# Patient Record
Sex: MALE | Race: WHITE | NOT HISPANIC OR LATINO | URBAN - METROPOLITAN AREA
[De-identification: names, ages, dates, MRNs, and addresses within clinical notes are randomized per-mention and may not be internally consistent; named-entity substitution may affect disease eponyms.]

---

## 2021-08-28 ENCOUNTER — TELEPHONE ENCOUNTER (OUTPATIENT)
Dept: URBAN - METROPOLITAN AREA CLINIC 13 | Facility: CLINIC | Age: 66
End: 2021-08-28

## 2021-08-29 ENCOUNTER — TELEPHONE ENCOUNTER (OUTPATIENT)
Dept: URBAN - METROPOLITAN AREA CLINIC 13 | Facility: CLINIC | Age: 66
End: 2021-08-29

## 2022-10-31 ENCOUNTER — DASHBOARD ENCOUNTERS (OUTPATIENT)
Age: 67
End: 2022-10-31

## 2022-10-31 ENCOUNTER — OFFICE VISIT (OUTPATIENT)
Dept: URBAN - METROPOLITAN AREA CLINIC 70 | Facility: CLINIC | Age: 67
End: 2022-10-31
Payer: COMMERCIAL

## 2022-10-31 ENCOUNTER — LAB OUTSIDE AN ENCOUNTER (OUTPATIENT)
Dept: URBAN - METROPOLITAN AREA CLINIC 70 | Facility: CLINIC | Age: 67
End: 2022-10-31

## 2022-10-31 VITALS
HEART RATE: 83 BPM | DIASTOLIC BLOOD PRESSURE: 78 MMHG | HEIGHT: 71 IN | WEIGHT: 302 LBS | SYSTOLIC BLOOD PRESSURE: 151 MMHG | BODY MASS INDEX: 42.28 KG/M2 | TEMPERATURE: 98.1 F

## 2022-10-31 DIAGNOSIS — Z12.11 COLON CANCER SCREENING: ICD-10-CM

## 2022-10-31 PROCEDURE — 99212 OFFICE O/P EST SF 10 MIN: CPT

## 2022-10-31 RX ORDER — FINASTERIDE 5 MG/1
1 TABLET TABLET, FILM COATED ORAL ONCE A DAY
Status: ACTIVE | COMMUNITY

## 2022-10-31 RX ORDER — TAMSULOSIN HYDROCHLORIDE 0.4 MG/1
1 CAPSULE CAPSULE ORAL ONCE A DAY
Status: ACTIVE | COMMUNITY

## 2022-10-31 RX ORDER — DAPAGLIFLOZIN AND METFORMIN HYDROCHLORIDE 5; 1000 MG/1; MG/1
1 TABLET TABLET, FILM COATED, EXTENDED RELEASE ORAL ONCE A DAY
Status: ACTIVE | COMMUNITY

## 2022-10-31 RX ORDER — DULAGLUTIDE 1.5 MG/.5ML
AS DIRECTED INJECTION, SOLUTION SUBCUTANEOUS
Status: ACTIVE | COMMUNITY

## 2022-10-31 RX ORDER — ATORVASTATIN CALCIUM 80 MG/1
1 TABLET TABLET, FILM COATED ORAL ONCE A DAY
Status: ACTIVE | COMMUNITY

## 2022-10-31 RX ORDER — MELOXICAM 7.5 MG
1 TABLET TABLET ORAL ONCE A DAY
Status: ACTIVE | COMMUNITY

## 2022-10-31 NOTE — HPI-TODAY'S VISIT:
The patient presents for colon cancer screening. Last colonoscopy was done in 2017 showing benign polyps per the pt.  There is no family history of colon cancer.  They deny any abdominal pain, diarrhea, constipation, rectal bleeding, or weight loss.

## 2022-11-30 ENCOUNTER — CLAIMS CREATED FROM THE CLAIM WINDOW (OUTPATIENT)
Dept: URBAN - METROPOLITAN AREA CLINIC 4 | Facility: CLINIC | Age: 67
End: 2022-11-30
Payer: COMMERCIAL

## 2022-11-30 ENCOUNTER — CLAIMS CREATED FROM THE CLAIM WINDOW (OUTPATIENT)
Dept: URBAN - METROPOLITAN AREA SURGERY CENTER 24 | Facility: SURGERY CENTER | Age: 67
End: 2022-11-30
Payer: COMMERCIAL

## 2022-11-30 ENCOUNTER — CLAIMS CREATED FROM THE CLAIM WINDOW (OUTPATIENT)
Dept: URBAN - METROPOLITAN AREA SURGERY CENTER 24 | Facility: SURGERY CENTER | Age: 67
End: 2022-11-30

## 2022-11-30 DIAGNOSIS — Z86.010 ADENOMAS PERSONAL HISTORY OF COLONIC POLYPS: ICD-10-CM

## 2022-11-30 DIAGNOSIS — D12.3 BENIGN NEOPLASM OF TRANSVERSE COLON: ICD-10-CM

## 2022-11-30 DIAGNOSIS — D12.3 ADENOMA OF TRANSVERSE COLON: ICD-10-CM

## 2022-11-30 DIAGNOSIS — D12.4 ADENOMA OF DESCENDING COLON: ICD-10-CM

## 2022-11-30 DIAGNOSIS — D12.2 ADENOMA OF ASCENDING COLON: ICD-10-CM

## 2022-11-30 PROCEDURE — 88305 TISSUE EXAM BY PATHOLOGIST: CPT | Performed by: PATHOLOGY

## 2022-11-30 PROCEDURE — G8907 PT DOC NO EVENTS ON DISCHARG: HCPCS | Performed by: INTERNAL MEDICINE

## 2022-11-30 PROCEDURE — 45385 COLONOSCOPY W/LESION REMOVAL: CPT | Performed by: INTERNAL MEDICINE

## 2023-01-25 ENCOUNTER — TELEPHONE ENCOUNTER (OUTPATIENT)
Dept: URBAN - METROPOLITAN AREA CLINIC 63 | Facility: CLINIC | Age: 68
End: 2023-01-25